# Patient Record
(demographics unavailable — no encounter records)

---

## 2025-04-24 NOTE — PHYSICAL EXAM
[de-identified] : The patient is sitting comfortably in the exam room.  LEFT hip -Skin is intact, no swelling, no ecchymosis -incisions well-healed, no erythema, no signs of infection -No tenderness to palpation over the greater trochanter -Painless range of motion hip -Sensation is intact L1-S1 -5/5 EHL, FHL, TA, GS, quadriceps, hamstrings -Foot is warm and well-perfused, palpable dorsalis pedis pulse  [de-identified] : ACC: [id number] EXAM: CT 3D RECONSTRUCT W YULIANA ORDERED BY: DENA ROSS  ACC: [id number] EXAM: CT PELVIS BONY ONLY ORDERED BY: DENA ROSS  PROCEDURE DATE: 04/17/2025  INTERPRETATION: CT OF THE PELVIS  CLINICAL INFORMATION: Left-sided pelvic/hip pain after a fall. Question fracture. TECHNIQUE: Multidetector CT of the pelvis. The study was performed without the use of intravenous or intra-articular contrast. Multiplanar reformats were generated for review. Three dimensional reconstructions were obtained on an independent workstation. The interpretation of these images is included in the body of the report of the main portion of the study.  COMPARISON: Pelvic CT 4/7/2025.  FINDINGS:  HARDWARE: Patient status post left total hip arthroplasty. Hardware is intact. No periprosthetic lucency to suggest loosening.  BONE: Nondisplaced fracture in the left greater trochanter (2:125-152, 601:124-131). No additional acute fracture.  JOINTS: No dislocation. Multilevel facet arthropathy and spondylosis of the lower lumbar spine. Degenerative changes of the bilateral sacroiliac joints. Mild degenerative change of the pubic symphysis. Right hip cartilage space narrowing with subcortical sclerosis, cystic change, and marginal osteophyte formation. No large joint effusion.  SOFT TISSUE: Penile implant with reservoir. Vascular calcifications. No pelvic free fluid or lymphadenopathy. Small fluid collection in the left greater trochanteric bursa, likely secondary to adjacent fracture. No focal muscle atrophy.   IMPRESSION: Nondisplaced acute fracture of the left greater trochanter.  --- End of Report ---  KATHIE WAGONER MD; Attending Radiologist This document has been electronically signed. Apr 17 2025 8:45AM

## 2025-04-24 NOTE — HISTORY OF PRESENT ILLNESS
[de-identified] : Mr. ISAAC CHENEY is a 85 year old gentleman presenting for initial evaluation of a left hip injury sustained on 4/17/25. He fell at home on 2 separate occasions, the first on 4/4 and the second on 4/17. Of note, he is s/p left total hip replacement. He was seen at [facility] ED where x-rays and a CT scan were performed. He notes pain at the lateral aspect of the left hip. He presents with his wife who has been contributing to his HPI. He is ambulating with a walker.   He sees a neurologist for treatment of dementia

## 2025-04-24 NOTE — DISCUSSION/SUMMARY
[de-identified] : 85-year-old gentleman with a left greater trochanter fracture, approximately 1 week out  -The risks and benefits of operative versus nonoperative management were discussed at length with the patient. The patient shows a good understanding of the injury and treatment options. They would like to move forward with nonoperative management -WBAT left LE with walker -Physical Therapy: work on gait training -Tylenol and Ice for pain -Follow-up in 2 months with x-rays of the left hip and AP pelvis at that time.  -All of the patient's questions and concerns were addressed.

## 2025-04-24 NOTE — HISTORY OF PRESENT ILLNESS
[de-identified] : Mr. ISAAC CHENEY is a 85 year old gentleman presenting for initial evaluation of a left hip injury sustained on 4/17/25. He fell at home on 2 separate occasions, the first on 4/4 and the second on 4/17. Of note, he is s/p left total hip replacement. He was seen at [facility] ED where x-rays and a CT scan were performed. He notes pain at the lateral aspect of the left hip. He presents with his wife who has been contributing to his HPI. He is ambulating with a walker.   He sees a neurologist for treatment of dementia

## 2025-04-24 NOTE — DISCUSSION/SUMMARY
[de-identified] : 85-year-old gentleman with a left greater trochanter fracture, approximately 1 week out  -The risks and benefits of operative versus nonoperative management were discussed at length with the patient. The patient shows a good understanding of the injury and treatment options. They would like to move forward with nonoperative management -WBAT left LE with walker -Physical Therapy: work on gait training -Tylenol and Ice for pain -Follow-up in 2 months with x-rays of the left hip and AP pelvis at that time.  -All of the patient's questions and concerns were addressed.

## 2025-04-24 NOTE — PHYSICAL EXAM
[de-identified] : The patient is sitting comfortably in the exam room.  LEFT hip -Skin is intact, no swelling, no ecchymosis -incisions well-healed, no erythema, no signs of infection -No tenderness to palpation over the greater trochanter -Painless range of motion hip -Sensation is intact L1-S1 -5/5 EHL, FHL, TA, GS, quadriceps, hamstrings -Foot is warm and well-perfused, palpable dorsalis pedis pulse  [de-identified] : ACC: [id number] EXAM: CT 3D RECONSTRUCT W YULIANA ORDERED BY: DENA ROSS  ACC: [id number] EXAM: CT PELVIS BONY ONLY ORDERED BY: DENA ROSS  PROCEDURE DATE: 04/17/2025  INTERPRETATION: CT OF THE PELVIS  CLINICAL INFORMATION: Left-sided pelvic/hip pain after a fall. Question fracture. TECHNIQUE: Multidetector CT of the pelvis. The study was performed without the use of intravenous or intra-articular contrast. Multiplanar reformats were generated for review. Three dimensional reconstructions were obtained on an independent workstation. The interpretation of these images is included in the body of the report of the main portion of the study.  COMPARISON: Pelvic CT 4/7/2025.  FINDINGS:  HARDWARE: Patient status post left total hip arthroplasty. Hardware is intact. No periprosthetic lucency to suggest loosening.  BONE: Nondisplaced fracture in the left greater trochanter (2:125-152, 601:124-131). No additional acute fracture.  JOINTS: No dislocation. Multilevel facet arthropathy and spondylosis of the lower lumbar spine. Degenerative changes of the bilateral sacroiliac joints. Mild degenerative change of the pubic symphysis. Right hip cartilage space narrowing with subcortical sclerosis, cystic change, and marginal osteophyte formation. No large joint effusion.  SOFT TISSUE: Penile implant with reservoir. Vascular calcifications. No pelvic free fluid or lymphadenopathy. Small fluid collection in the left greater trochanteric bursa, likely secondary to adjacent fracture. No focal muscle atrophy.   IMPRESSION: Nondisplaced acute fracture of the left greater trochanter.  --- End of Report ---  KATHIE WAGONER MD; Attending Radiologist This document has been electronically signed. Apr 17 2025 8:45AM

## 2025-06-23 NOTE — PHYSICAL EXAM
[de-identified] : The patient is sitting comfortably in the exam room.  LEFT hip -Skin is intact, no swelling, no ecchymosis -incisions well-healed, no erythema, no signs of infection - Mild tenderness to palpation over the greater trochanter -Painless range of motion hip -Sensation is intact L1-S1 -5/5 EHL, FHL, TA, GS, quadriceps, hamstrings -Foot is warm and well-perfused, palpable dorsalis pedis pulse  [de-identified] :  X-rays of the left hip and AP pelvis were taken in the office today, 6/25/25. X-rays include AP and lateral of the hip.  Render Post-Care Instructions In Note?: no Extraction Method: 18 gauge needle Acne Type: Milial cysts Consent was obtained and risks were reviewed including but not limited to scarring, infection, bleeding, scabbing, incomplete removal, and allergy to anesthesia. Prep Text (Optional): Prior to removal the treatment areas were prepped in the usual fashion.\\n1 lesions Detail Level: Detailed Hemostasis: Aluminum Chloride Render Number Of Lesions Treated: yes Post-Care Instructions: I reviewed with the patient in detail post-care instructions. Patient is to keep the treatment areas dry overnight, and then apply bacitracin twice daily until healed. Patient may apply hydrogen peroxide soaks to remove any crusting.

## 2025-06-23 NOTE — HISTORY OF PRESENT ILLNESS
[de-identified] : Mr. ISAAC CHENEY is a 85 year old gentleman presenting for follow up evaluation of a left greater trochanter fracture sustained on 4/17/25, being treated nonoperatively. Since his last visit he states he is feeling  He sees a neurologist for treatment of dementia

## 2025-06-26 NOTE — LETTER BODY
[FreeTextEntry1] : No PCP listed at this time.   Reason for Visit: BPH. Urinary urgency. Erectile dysfunction.   This is a 85 year -old male with symptoms of BPH, erectile dysfunction. Patient follows up today. He is status post simple prostatectomy with Dr Buck. He is status post insertion of penile prosthesis with Dr Grimm. He reports doing well. All other review of systems are negative. He has no cancer in his family medical history. He has no previous surgical history. Past medical history, family history and social history were inquired and were noncontributory to current condition. The patient does not use tobacco or drink alcohol. Medications and allergies were reviewed. He has no known allergies to medication.     On examination, the patient is a well-appearing male in no acute distress. He is alert and oriented follows commands. He has normal mood and affect. He is normocephalic. Neck is supple. Oral no thrush Respirations are unlabored. His abdomen is soft and nontender. Bladder is nonpalpable. No CVA tenderness. Neurologically he is grossly intact. No peripheral edema. Skin without gross abnormality.  Patient has a penile prosthesis.  No evidence of penile erosion.  The penile pump is on the right hemiscrotum.  Palpably normal.  The patient  and his wife were reeducated on how to activate and deactivate the  penile prosthesis.  ASSESSMENT: BPH, Urinary urgency. Erectile dysfunction.   I counseled the patient on the various etiology of his symptoms. In terms of his BPH, he is status post simple prostatectomy with Dr Buck doing well. I recommended that he obtain PSA, BMP, urinalysis and urine cytology to ensure stability. In terms of his erectile dysfunction, the patient is status post insertion of penile prosthesis with Dr Grimm. I re-educated the patient and his wife on how to activated/deactivated the device. Risks and alternatives were discussed. I answered the patient's questions. The patient will follow-up as directed and will contact me with any questions or concerns. Thank you for the opportunity to participate in the care of Mr. CHENEY. I will keep you updated on his progress.  Patient will continue longitudinal care for their complex and chronic condition.   Plan: PSA. BMP. Urinalysis. Urine cytology. Follow up in 1 year.   I spent 30-minutes time today on all issues related to this patient on today date of service including non face to face time.

## 2025-06-26 NOTE — HISTORY OF PRESENT ILLNESS
[de-identified] : Mr. ISAAC CHENEY is a 85 year old gentleman presenting for follow up evaluation of a left greater trochanter fracture sustained on 4/17/25, being treated nonoperatively. Since his last visit he states he is feeling better. He notes an improvement in pain and is participating in Pt twice a week.   He sees a neurologist for treatment of dementia

## 2025-06-26 NOTE — HISTORY OF PRESENT ILLNESS
[FreeTextEntry1] : Follow-up BPH.  Patient history of BPH status post simple prostatectomy with Dr. Buck.  Doing well.  PSA.  ED.  Patient has history of erectile dysfunction.  Status post Insertion penile prosthesis.  By Dr. Grimm, I re=educated  the patient and his wife how to activate deactivate the device.  Followup in 1 year  Please refer to [facility] Consult note

## 2025-06-26 NOTE — ADDENDUM
[FreeTextEntry1] : Entered by Nilda Wilks, acting as scribe for Dr Jacob Dhillon. The documentation recorded by the scribe accurately reflects the service I personally performed and the decisions made by me.

## 2025-06-26 NOTE — DISCUSSION/SUMMARY
[de-identified] : 85-year-old gentleman with a left greater trochanter fracture, nearly 10 weeks out doing well  -X-ray and physical exam findings were discussed with the patient -WBAT left LE with walker -Physical Therapy: work on gait training -Tylenol and Ice for pain -Follow-up as needed with x-rays of the left hip and AP pelvis at that time.  -Follow up with a spine specialist -All of the patient's questions and concerns were addressed.

## 2025-06-26 NOTE — PHYSICAL EXAM
[de-identified] : The patient is sitting comfortably in the exam room.  LEFT hip -Skin is intact, no swelling, no ecchymosis -incisions well-healed, no erythema, no signs of infection - Mild tenderness to palpation over the greater trochanter -Painless range of motion hip -Sensation is intact L1-S1 -5/5 EHL, FHL, TA, GS, quadriceps, hamstrings -Foot is warm and well-perfused, palpable dorsalis pedis pulse  [de-identified] :  X-rays of the left hip and AP pelvis were taken in the office today, 6/26/25. X-rays include AP and lateral of the hip.  X-rays show good overall alignment of bilateral hips.  There is a total hip arthroplasty in the left hip.  The femoral component is in slight varus.  No new fractures or dislocations.

## 2025-06-26 NOTE — DISCUSSION/SUMMARY
[de-identified] : 85-year-old gentleman with a left greater trochanter fracture, nearly 10 weeks out doing well  -X-ray and physical exam findings were discussed with the patient -WBAT left LE with walker -Physical Therapy: work on gait training -Tylenol and Ice for pain -Follow-up as needed with x-rays of the left hip and AP pelvis at that time.  -Follow up with a spine specialist -All of the patient's questions and concerns were addressed.

## 2025-06-26 NOTE — PHYSICAL EXAM
[de-identified] : The patient is sitting comfortably in the exam room.  LEFT hip -Skin is intact, no swelling, no ecchymosis -incisions well-healed, no erythema, no signs of infection - Mild tenderness to palpation over the greater trochanter -Painless range of motion hip -Sensation is intact L1-S1 -5/5 EHL, FHL, TA, GS, quadriceps, hamstrings -Foot is warm and well-perfused, palpable dorsalis pedis pulse  [de-identified] :  X-rays of the left hip and AP pelvis were taken in the office today, 6/26/25. X-rays include AP and lateral of the hip.  X-rays show good overall alignment of bilateral hips.  There is a total hip arthroplasty in the left hip.  The femoral component is in slight varus.  No new fractures or dislocations.